# Patient Record
Sex: MALE | Race: WHITE | Employment: FULL TIME | URBAN - METROPOLITAN AREA
[De-identification: names, ages, dates, MRNs, and addresses within clinical notes are randomized per-mention and may not be internally consistent; named-entity substitution may affect disease eponyms.]

---

## 2021-03-01 ENCOUNTER — OFFICE VISIT (OUTPATIENT)
Dept: ORTHOPEDIC SURGERY | Age: 46
End: 2021-03-01
Payer: COMMERCIAL

## 2021-03-01 VITALS
SYSTOLIC BLOOD PRESSURE: 170 MMHG | HEART RATE: 90 BPM | BODY MASS INDEX: 32.21 KG/M2 | TEMPERATURE: 98.1 F | WEIGHT: 225 LBS | HEIGHT: 70 IN | DIASTOLIC BLOOD PRESSURE: 90 MMHG

## 2021-03-01 DIAGNOSIS — M54.50 ACUTE MIDLINE LOW BACK PAIN, UNSPECIFIED WHETHER SCIATICA PRESENT: Primary | ICD-10-CM

## 2021-03-01 DIAGNOSIS — S39.012A STRAIN OF LUMBAR REGION, INITIAL ENCOUNTER: ICD-10-CM

## 2021-03-01 PROCEDURE — 99203 OFFICE O/P NEW LOW 30 MIN: CPT | Performed by: PHYSICIAN ASSISTANT

## 2021-03-01 RX ORDER — METHOCARBAMOL 750 MG/1
750 TABLET, FILM COATED ORAL 3 TIMES DAILY
Qty: 90 TABLET | Refills: 0 | Status: SHIPPED | OUTPATIENT
Start: 2021-03-01 | End: 2021-03-31

## 2021-03-01 NOTE — PROGRESS NOTES
Subjective:      Patient ID: Baltazar Ramesh is a 39 y.o.  male. Chief Complaint   Patient presents with    Lower Back Pain     Lower back pain for about one week. No injury         HPI:   He is here for an initial evaluation of left-sided low back pain. Onset 1 week ago. No history of injury. Worse going from a seated to standing position    Pain has not changed since onset. Pain is associated with:  Numbness: No.  Tingling: No.   Perceived weakness: No.   Difficulty controlling bowels: No.  Difficulty controlling bladder: No.  Electrical shock sensation in her legs: No.  Difficulty with balance while standing or walking: No.    The following changes pain for the better: Sitting, standing, walking, walking while leaning on a grocery cart or lying down. The following changes pain for the worse: Rising from seated position and leaning forward. The following treatment has been tried:  Physical therapy: No.  Chiropractic treatment: No.  Epidural steroid injection/block: No.   Prescription medications: Naprosyn. Over-the-counter medications: No.       Review of Systems:  Pertinent items are noted in HPI. A 14 point review of systems have been reviewed from Patient History Form as well as the Spine Form dated on today's date and available in the patient's chart under the media tab. History reviewed. No pertinent past medical history. History reviewed. No pertinent family history. History reviewed. No pertinent surgical history. Social History     Occupational History    Not on file   Tobacco Use    Smoking status: Current Every Day Smoker    Smokeless tobacco: Never Used   Substance and Sexual Activity    Alcohol use:  Yes    Drug use: No    Sexual activity: Not on file       Current Outpatient Medications   Medication Sig Dispense Refill    methocarbamol (ROBAXIN-750) 750 MG tablet Take 1 tablet by mouth 3 times daily 90 tablet 0  DM-Doxylamine-Acetaminophen (NYQUIL COLD & FLU PO) Take  by mouth.  HYDROcodone-acetaminophen (NORCO) 5-325 MG per tablet Take 1 tablet by mouth every 6 hours as needed for Pain (Patient not taking: Reported on 3/1/2021) 8 tablet 0     Current Facility-Administered Medications   Medication Dose Route Frequency Provider Last Rate Last Admin    albuterol (PROVENTIL) nebulizer solution 2.5 mg  2.5 mg Nebulization Once Marcos Styles MD             Objective:     He is alert, oriented x 3, pleasant, well nourished, developed and in no acute distress. BP (!) 170/90   Pulse 90   Temp 98.1 °F (36.7 °C)   Ht 5' 10\" (1.778 m)   Wt 225 lb (102.1 kg)   BMI 32.28 kg/m²        Lumbar Spine Exam:  Deformity: Absent . Soft Tissue Swelling: Absent . Soft Tissue Tenderness: Present. Midline Bone Tenderness:Absent . Paraspinal Muscular Spasm: Present. Previous Incisions: Absent . Erythema Absent . Lumbar Flexion does produce pain. Lumbar Extension does not produce pain. Motor Exam:                                Normal=N   Weak=W   Unable=U   Toe Walk Heel Walk Single Leg Squat   Right            N           N               N   Left            N           N               N     Strength Scale: 1-5   Extensor Hallucis Longus L5 Anterior Tibialis   L4 Quadriceps   L2,3,4    Right             5            5         5   Left             5            5         5     Reflex Exam: 0-4+   Achilles Tendon (gastroc)   S1 Patellar Tendon (quads)   L4   Right                 2+              2+   Left                 2+              2+     Sensory Exam: Intact to light touch in the left and right lower extremity.     Special Testing: N= Negative  P= Positive   Straight leg raise Reverse straight leg raise Contralateral straight leg raise Log roll Herrera test (VERONICA) Babinski Garcia's  test   Right     N     N     N     N     N      N   Left     N     N     N     N     N      N       Gait normal Heel/ Toe. Left hip exam:  There is no deformity. There is no pain with internal and external rotation. There is no pain with flexion and extension. ROM- full range of motion. Trochanteric region is not tender to palpation. There is no pain with weight bearing. Right hip exam:  There is no deformity. There is no pain with internal and external rotation. There is no pain with flexion and extension. ROM- full range of motion. Trochanteric region is not tender to palpation. There is no pain with weight bearing. Vascular exam:  Examination of bilateral lower extremities:   Pallor or Rubor: absent. Digits are warm to touch, capillary refill is less than 2 seconds. There is No edema noted. Skin exam:  Examination of the skin over both lower extremities reveals: The skin to be intact without lacerations or abrasions. No significant erythema. No rashes or skin lesions. X Rays: performed in the office today:   AP and Lateral Lumbar Spine Radiographs:   Normal findings. The alignment of the lumbar spine is normal. There is no significant degenerative disc and no facet arthropathy. Normal lordotic curve is noted. Additional tests reviewed:  None      Diagnosis:        ICD-10-CM    1. Acute midline low back pain, unspecified whether sciatica present  M54.5 XR LUMBAR SPINE (2-3 VIEWS)   2. Strain of lumbar region, initial encounter  S39.012A Ambulatory referral to Physical Therapy        Assessment/ Plan:        Assessment:  Low back pain which I be believe is related to a lumbar strain. He is having muscle spasm. He remains neurologically intact in both lower extremities. 31 minutes was the total time spent on today's visit  including reviewing test results, obtaining or reviewing history, physical exam, counseling and educating, time spent on documentation in health record, ordering prescriptions, tests or procedures after the visit.     Plan: Medications-Robaxin as needed spasm may also use Naprosyn or Tylenol for pain    PT-Rx provided today if no improvement with medications in 2 weeks recommend therapy. Further Imaging-not indicated at this time. Procedures-none indicated at this time. Follow up-if no improvement in 4 weeks. Call or return to clinic if these symptoms worsen or fail to improve as anticipated.